# Patient Record
Sex: MALE | Race: WHITE | NOT HISPANIC OR LATINO | Employment: FULL TIME | URBAN - METROPOLITAN AREA
[De-identification: names, ages, dates, MRNs, and addresses within clinical notes are randomized per-mention and may not be internally consistent; named-entity substitution may affect disease eponyms.]

---

## 2017-09-07 ENCOUNTER — OFFICE VISIT - HEALTHEAST (OUTPATIENT)
Dept: FAMILY MEDICINE | Facility: CLINIC | Age: 37
End: 2017-09-07

## 2017-09-07 DIAGNOSIS — Z00.00 VISIT FOR PREVENTIVE HEALTH EXAMINATION: ICD-10-CM

## 2017-09-07 ASSESSMENT — MIFFLIN-ST. JEOR: SCORE: 1720.48

## 2017-09-19 ENCOUNTER — AMBULATORY - HEALTHEAST (OUTPATIENT)
Dept: LAB | Facility: CLINIC | Age: 37
End: 2017-09-19

## 2017-09-19 DIAGNOSIS — Z13.9 SCREENING FOR UNSPECIFIED CONDITION: ICD-10-CM

## 2017-10-03 ENCOUNTER — AMBULATORY - HEALTHEAST (OUTPATIENT)
Dept: FAMILY MEDICINE | Facility: CLINIC | Age: 37
End: 2017-10-03

## 2017-10-03 DIAGNOSIS — Z11.3 SCREEN FOR STD (SEXUALLY TRANSMITTED DISEASE): ICD-10-CM

## 2017-10-11 ENCOUNTER — AMBULATORY - HEALTHEAST (OUTPATIENT)
Dept: LAB | Facility: CLINIC | Age: 37
End: 2017-10-11

## 2017-10-11 DIAGNOSIS — Z11.3 SCREEN FOR STD (SEXUALLY TRANSMITTED DISEASE): ICD-10-CM

## 2017-10-13 LAB
HBV CORE IGM SERPL QL IA: NEGATIVE
HBV SURFACE AB SERPL IA-ACNC: NEGATIVE M[IU]/ML

## 2017-12-23 ENCOUNTER — RECORDS - HEALTHEAST (OUTPATIENT)
Dept: ADMINISTRATIVE | Facility: OTHER | Age: 37
End: 2017-12-23

## 2018-04-27 ENCOUNTER — RECORDS - HEALTHEAST (OUTPATIENT)
Dept: ADMINISTRATIVE | Facility: OTHER | Age: 38
End: 2018-04-27

## 2019-05-27 ENCOUNTER — RECORDS - HEALTHEAST (OUTPATIENT)
Dept: ADMINISTRATIVE | Facility: OTHER | Age: 39
End: 2019-05-27

## 2019-05-31 ENCOUNTER — OFFICE VISIT - HEALTHEAST (OUTPATIENT)
Dept: FAMILY MEDICINE | Facility: CLINIC | Age: 39
End: 2019-05-31

## 2019-05-31 DIAGNOSIS — R11.0 NAUSEA: ICD-10-CM

## 2019-05-31 DIAGNOSIS — R50.9 FEVER AND CHILLS: ICD-10-CM

## 2019-05-31 DIAGNOSIS — R74.01 TRANSAMINITIS: ICD-10-CM

## 2019-05-31 DIAGNOSIS — R10.84 ABDOMINAL PAIN, GENERALIZED: ICD-10-CM

## 2019-05-31 LAB
ALBUMIN SERPL-MCNC: 3.4 G/DL (ref 3.5–5)
ALP SERPL-CCNC: 142 U/L (ref 45–120)
ALT SERPL W P-5'-P-CCNC: 170 U/L (ref 0–45)
ANION GAP SERPL CALCULATED.3IONS-SCNC: 13 MMOL/L (ref 5–18)
AST SERPL W P-5'-P-CCNC: 205 U/L (ref 0–40)
BILIRUB SERPL-MCNC: 0.6 MG/DL (ref 0–1)
BUN SERPL-MCNC: 9 MG/DL (ref 8–22)
CALCIUM SERPL-MCNC: 8.9 MG/DL (ref 8.5–10.5)
CHLORIDE BLD-SCNC: 103 MMOL/L (ref 98–107)
CO2 SERPL-SCNC: 22 MMOL/L (ref 22–31)
CREAT SERPL-MCNC: 0.69 MG/DL (ref 0.7–1.3)
ERYTHROCYTE [SEDIMENTATION RATE] IN BLOOD BY WESTERGREN METHOD: 23 MM/HR (ref 0–15)
GFR SERPL CREATININE-BSD FRML MDRD: >60 ML/MIN/1.73M2
GLUCOSE BLD-MCNC: 88 MG/DL (ref 70–125)
HAV IGM SERPL QL IA: NEGATIVE
HBV CORE IGM SERPL QL IA: NEGATIVE
HBV SURFACE AG SERPL QL IA: NEGATIVE
HCV AB SERPL QL IA: NEGATIVE
HIV 1+2 AB+HIV1 P24 AG SERPL QL IA: NEGATIVE
POTASSIUM BLD-SCNC: 3.8 MMOL/L (ref 3.5–5)
PROT SERPL-MCNC: 6.5 G/DL (ref 6–8)
SODIUM SERPL-SCNC: 138 MMOL/L (ref 136–145)

## 2019-05-31 RX ORDER — ONDANSETRON 8 MG/1
8 TABLET, ORALLY DISINTEGRATING ORAL EVERY 8 HOURS PRN
Qty: 10 TABLET | Refills: 0 | Status: SHIPPED | OUTPATIENT
Start: 2019-05-31 | End: 2023-01-18

## 2019-05-31 RX ORDER — IBUPROFEN 200 MG
200 TABLET ORAL EVERY 6 HOURS PRN
Status: SHIPPED | COMMUNITY
Start: 2019-05-31 | End: 2023-01-18

## 2019-05-31 ASSESSMENT — MIFFLIN-ST. JEOR: SCORE: 1747.46

## 2019-06-02 LAB
EBV EA-D IGG SER-ACNC: <0.2 AI (ref 0–0.8)
EBV NA IGG SER IA-ACNC: <0.2 AI (ref 0–0.8)
EBV VCA IGG SER IA-ACNC: <0.2 AI (ref 0–0.8)
EBV VCA IGM SER IA-ACNC: 1.3 AI (ref 0–0.8)

## 2019-06-03 ENCOUNTER — AMBULATORY - HEALTHEAST (OUTPATIENT)
Dept: FAMILY MEDICINE | Facility: CLINIC | Age: 39
End: 2019-06-03

## 2019-06-03 ENCOUNTER — COMMUNICATION - HEALTHEAST (OUTPATIENT)
Dept: FAMILY MEDICINE | Facility: CLINIC | Age: 39
End: 2019-06-03

## 2019-06-03 ENCOUNTER — RECORDS - HEALTHEAST (OUTPATIENT)
Dept: FAMILY MEDICINE | Facility: CLINIC | Age: 39
End: 2019-06-03

## 2019-06-03 DIAGNOSIS — A69.20 LYME DISEASE: ICD-10-CM

## 2019-06-03 LAB — B BURGDOR IGG+IGM SER QL: 8.44 INDEX VALUE

## 2019-06-07 LAB
B BURGDOR AB SER-IMP: ABNORMAL
LYME AB IGG BAND(S): ABNORMAL
LYME AB IGM BAND(S): ABNORMAL
LYME IGG BLOT: POSITIVE
LYME IGM BLOT: NEGATIVE

## 2019-06-10 ENCOUNTER — COMMUNICATION - HEALTHEAST (OUTPATIENT)
Dept: FAMILY MEDICINE | Facility: CLINIC | Age: 39
End: 2019-06-10

## 2019-07-10 ENCOUNTER — AMBULATORY - HEALTHEAST (OUTPATIENT)
Dept: FAMILY MEDICINE | Facility: CLINIC | Age: 39
End: 2019-07-10

## 2019-07-10 ENCOUNTER — COMMUNICATION - HEALTHEAST (OUTPATIENT)
Dept: FAMILY MEDICINE | Facility: CLINIC | Age: 39
End: 2019-07-10

## 2019-07-10 DIAGNOSIS — R74.01 TRANSAMINITIS: ICD-10-CM

## 2021-05-25 ENCOUNTER — RECORDS - HEALTHEAST (OUTPATIENT)
Dept: ADMINISTRATIVE | Facility: CLINIC | Age: 41
End: 2021-05-25

## 2021-05-29 NOTE — PATIENT INSTRUCTIONS - HE
1. Acetaminophen (500 mg tabs): maximum 6-8 tabs  2. Ibuprofen can be 600 mg every 6-8 hours  3. Try the anti nausea medications  4. Labs today --I will let you know the results  5. Return to clinic next week if symptoms still present

## 2021-05-29 NOTE — PROGRESS NOTES
Assessment & Plan  The patient's labs resulted in a positive Lyme's disease titer, and a positive EBV IgM test.  These tests are known to cause cross-reactivity.  His symptoms are most consistent with mononucleosis, and a positive EBV test can cause a false positive Lyme's disease test.  I did discuss this with the patient.  We did discuss that Lyme's disease can be treated with an antibiotic to prevent long-term complications.  We discussed that it may be unnecessary to take the antibiotic if this is just a false positive test.  He would like to be treated for Lyme's disease, as he would like to prevent this long-term complication.  I prescribed doxycycline 100 mg twice daily for 21 days.  We did discuss the common side effects including photosensitivity.  We discussed the diagnosis of mononucleosis, including the risk of splenic rupture.  He was advised to be seen immediately if he has abdominal pain.  He continues to have elevated liver function tests.  I think it would be reasonable to recheck these in about 2 to 4 weeks.      1. Fever and chills  Fever of unknown source for the last 5 days.  At this point unable to determine whether this is a viral illness.  Will obtain testing for Emerald-Bartholomew, HIV, hepatitis.  If the fever continues, I advised that he should be seen again next week.  We discussed symptomatic treatment, including the maximum doses recommended of Tylenol and ibuprofen.  If the fever persists for 3 weeks, then additional testing should be done.    Per AAFP recommendations, if the fever lasts more than 3 weeks a more in depth workup may be indicated including: blood cultures, CXR, lactate dehydrogenase, creatine kinase, rheumatoid factor, and antinuclear antibodies    2. Abdominal pain, generalized  No abdominal pain on palpation in clinic today.  I will repeat a CMP given the abnormalities earlier this week and his continued symptoms.  He did have an abnormal CT with borderline splenomegaly.  We  discussed that he should avoid any activity that could cause trauma to his abdomen due to risk of splenic rupture.  I would repeat imaging in about a month to ensure stable or resolving changes-- Ultrasound.     - HIV Antigen/Antibody Screening Cascade  - Hepatitis Acute Evaluation  - Comprehensive Metabolic Panel  - Erythrocyte Sedimentation Rate  - Emerald-Barr Virus (EBV) Antibodies, IgG  - Emerald-Barr Virus (EBV) Capsid Antibody,IGM(EBVCAM)    3. Transaminitis  Congestion pattern of elevated transaminitis.  Normal bilirubin and normal platelets.  We will repeat the labs today in addition to an acute hepatitis panel.    - Hepatitis Acute Evaluation  - Comprehensive Metabolic Panel  -EBV serology    4. Nausea  - ondansetron (ZOFRAN-ODT) 8 MG disintegrating tablet; Take 1 tablet (8 mg total) by mouth every 8 (eight) hours as needed for nausea.  Dispense: 10 tablet; Refill: 0    5. Nevi:  Does have some abnormal looking nevi on his back.  And I do not think this is contributing to his current illness.  I did recommend that he return to clinic to have these biopsied.  There is one on the left lower back that is dark and greater than 0.8 cm.    Gypsy Ballard MD    Subjective  Chief Complaint:  Follow-up (chills, nausea, fatigue, fever (103) x 1 week)    HPI:   Abhay Quintero is a 39 y.o. male who presents for follow-up from [facility].  He was seen there on May 27.  He complains of an illness that started 10 days ago.  At that time he felt fatigued and had decreased appetite.  He then started to have high fevers about 5 days ago.  These fevers were to 102 to 103 degrees.  He was evaluated at the [facility] with blood tests and a CT of his abdomen.  He was given fluids and some medicine for nausea.  He was told that he likely had a viral infection, but that they were not sure what the infection was.    Upon review of his chart, his labs were remarkable for elevated LFTs, alk phos.  He had a normal complete  blood count with normal white count.  He had a negative flu test and a negative heterophile for mono.  The CT was remarkable for borderline splenomegaly.    He continues to feel the same symptoms.  He has loss of appetite but no vomiting.  He is able to drink water by taking small sips.  He has a normal appetite when he starts to eat feels more nauseated.  He has had no change in his stools.  No dysuria.  No cough, no rhinorrhea.  He has no recent travel outside of the [place].  He has no sick contacts.  He does not work in healthcare.  He has no rashes.  He does have night sweats.  Last fever was this morning.  He has been taking ibuprofen and Tylenol alternating doses.  He has not taken any other over-the-counter medications.  The abdominal pain that he complained about was actually his right flank.  He denies dysuria.    Prior to this illness, he did not take any medications or over-the-counter's.  He denies a history of tobacco or drug use.  He does drink alcohol, about 1-2 servings per day.  He has not had any alcohol since this started.      Allergies:  has No Known Allergies.    SH/FH:  Social History and Family History reviewed and updated.   Tobacco Status:  He  reports that he has never smoked. He has never used smokeless tobacco.    Review of Systems:    Constitutional: No Weight Change,  Fever,  Chills,  Night Sweats, fatigue   ENT/Mouth: No sore throat, No Rhinorrhea, No Swallowing Difficulty   Eyes: No Vision Changes   Cardiovascular: No Chest Pain, No SOB, No Edema, No Palpitations   Respiratory: No Cough, No Dyspnea   Gastrointestinal:  Nausea, No Vomiting, No Diarrhea, No Constipation,  Pain,   Genitourinary: No Dysuria   Musculoskeletal: No Arthralgias  Skin: No Skin Lesions  Neuro:  Weakness, No Numbness, No Headache  Heme/Lymph: No Bruising, has not noticed any lymphadenopathy  Endocrine: No Polyuria    Objective  Vitals:    05/31/19 0855   BP: 110/60   Patient Site: Right Arm   Patient  "Position: Sitting   Cuff Size: Adult Regular   Pulse: 90   Temp: 98.4  F (36.9  C)   TempSrc: Oral   SpO2: 97%   Weight: 178 lb (80.7 kg)   Height: 5' 11.5\" (1.816 m)       Physical Exam:  GENERAL: Alert, well-appearing, in no acute distress.   PSYCH: Pleasant mood, affect appropriate.  Good eye contact.  SKIN: several nevi on his lower back -- a couple of which are >0.8 cm.  HEAD: Normocephalic, atraumatic  EYES: Conjunctiva pink, sclera white, no exudates. RANDI.  EOMs intact.   Ears: TM clear bilaterally  MOUTH: Pharynx moist, pink without exudate. No tonsillar enlargement  NECK: No lymphadenopathy. No palpable thyromegaly or nodules.   CV: Regular rate and rhythm without murmurs, rubs or gallops.   RESP: No increased work of breathing.  Lung sounds bilateral, clear, symmetric excursion.   ABDOMEN: No abdominal distension. Abdomen soft, nontender to palpation without guarding. No palpable organomegaly, masses or hernias.  NO CVA tenderness.  No tenderness of the flank on palpation  MSK: No deformity.    NEURO: Alert and oriented. Normal motor and sensory        "

## 2021-05-29 NOTE — TELEPHONE ENCOUNTER
Test Results  Who is calling?:  Patient  Who ordered the test:  Gypsy Angel .  Type of test: Lab  Date of test:  05/31/19  Where was the test performed:  [facility]  What are your questions/concerns?:  Patients was to discuss about lab results with the provider. Pt would like to get a call.  Okay to leave a detailed message?:  Yes

## 2021-05-29 NOTE — TELEPHONE ENCOUNTER
I think I already spoke to him regarding these results.  Do you know when this message was sent?  I reviewed the mono and lyme disease tests.

## 2021-05-31 VITALS — HEIGHT: 72 IN | WEIGHT: 171 LBS | BODY MASS INDEX: 23.16 KG/M2

## 2021-06-03 VITALS — HEIGHT: 72 IN | WEIGHT: 178 LBS | BODY MASS INDEX: 24.11 KG/M2

## 2021-06-12 NOTE — PROGRESS NOTES
Assessment:      Healthy male exam.        Plan:     1.  Regular skin exam at home, and follow-up with dermatologist on a yearly basis.  2. Patient Counseling:  --Nutrition: Stressed importance of moderation in sodium/caffeine intake, saturated fat and cholesterol, caloric balance, sufficient intake of fresh fruits, vegetables, fiber, calcium, iron.  --Discussed the issue of calcium supplement, and the daily use of baby aspirin.  --Exercise: Stressed the importance of regular exercise.   --Substance Abuse: Discussed cessation/primary prevention of tobacco, alcohol, or other drug use; driving or other dangerous activities under the influence; availability of treatment for abuse.    --Sexuality: Discussed sexually transmitted diseases, partner selection, use of condoms, avoidance of unintended pregnancy.  --Injury prevention: Discussed safety belts, safety helmets, smoke detector, smoking near bedding or upholstery.   --Dental health: Discussed importance of regular tooth brushing, flossing, and dental visits.  --Immunizations reviewed.  --Discussed timing and benefits of screening colonoscopy and alternative options.  --After hours service discussed with patient             -- The following high BMI interventions were performed this visit: encouragement to exercise    3. Discussed the patient's BMI with him.  The BMI is in the acceptable range  4. Follow up as needed for acute illness     Subjective:      Abhay Quintero is a 37 y.o. male who presents for an annual exam. The patient reports that there is not domestic violence in his life.   Overall doing fine, he is involved in a fertilization program with his wife, they are asking him to get a physical exam.  Healthy Habits:   Regular Exercise: Yes  Sunscreen Use: Yes  Healthy Diet: Yes  Dental Visits Regularly: Yes  Seat Belt: Yes  Sexually active: Yes  Monthly Self Testicular Exams:  Yes  Hemoccults: N/A  Flex Sig: N/A  Colonoscopy: N/A  Lipid Profile:  "No  Glucose Screen: Yes  Prevention of Osteoporosis: Yes  Last Dexa: N/A  Guns at Home:  N/A      Immunization History   Administered Date(s) Administered     Influenza, inj, historic 10/05/2016     Influenza, nasal, historic 09/14/2013     Influenza,live, Nasal Laiv4 09/30/2014     MMR 04/13/1993     Tdap 09/30/2014     Immunization status: stated as current, but no records available.  Vision Screening:both eyes  Hearing: PASS     No current outpatient prescriptions on file.     No current facility-administered medications for this visit.      Past Medical History:   Diagnosis Date     Acute pneumothorax      No past surgical history on file.  Review of patient's allergies indicates no known allergies.  No family history on file.  Social History     Social History     Marital status:      Spouse name: N/A     Number of children: N/A     Years of education: N/A     Occupational History     Not on file.     Social History Main Topics     Smoking status: Never Smoker     Smokeless tobacco: Not on file     Alcohol use Not on file     Drug use: Not on file     Sexual activity: Not on file     Other Topics Concern     Not on file     Social History Narrative       Review of Systems  General:  Denies problem  Eyes: Denies problem  Ears/Nose/Throat: Denies problem  Cardiovascular: Denies problem  Respiratory:  Denies problem  Gastrointestinal:  Denies problem  Genitourinary: Denies problem  Musculoskeletal:  Denies problem  Skin: Denies problem  Neurologic: Denies problem  Psychiatric: Denies problem  Endocrine: Denies problem  Heme/Lymphatic: Denies problem   Allergic/Immunologic: Denies problem        Objective:     Vitals:    09/07/17 0749   BP: 118/68   Pulse: 60   Resp: 12   Temp: 97.4  F (36.3  C)   TempSrc: Oral   Weight: 171 lb (77.6 kg)   Height: 5' 11.8\" (1.824 m)     Body mass index is 23.32 kg/(m^2).    Physical  General Appearance: Alert, cooperative, no distress, appears stated age  Head: " Normocephalic, without obvious abnormality, atraumatic  Eyes: PERRL, conjunctiva/corneas clear, EOM's intact  Ears: Normal TM's and external ear canals, both ears  Nose: Nares normal, septum midline,mucosa normal, no drainage  Throat: Lips, mucosa, and tongue normal; teeth and gums normal  Neck: Supple, symmetrical, trachea midline, no adenopathy;  thyroid: not enlarged, symmetric, no tenderness/mass/nodules; no carotid bruit or JVD  Back: Symmetric, no curvature, ROM normal, no CVA tenderness  Lungs: Clear to auscultation bilaterally, respirations unlabored  Heart: Regular rate and rhythm, S1 and S2 normal, no murmur, rub, or gallop,  Abdomen: Soft, non-tender, bowel sounds active all four quadrants,  no masses, no organomegaly  Genitourinary: Penis normal. Right and left testis are descended.No palpable masses.   Rectal: No visible external lesion  Musculoskeletal: Normal range of motion. No joint swelling or deformity.   Extremities: Extremities normal, atraumatic, no cyanosis or edema  Skin: Skin color, texture, turgor normal, no rashes or lesions  Lymph nodes: Cervical, supraclavicular, and axillary nodes normal  Neurologic: He is alert. He has normal reflexes.   Psychiatric: He has a normal mood and affect.        Bharathi Mcclellan MD

## 2021-06-19 NOTE — LETTER
Letter by Gypsy Ballard MD at      Author: Gypsy Blalard MD Service: -- Author Type: --    Filed:  Encounter Date: 6/3/2019 Status: (Other)         Gayle 3, 2019     Patient: Abhay Quintero   YOB: 1980   Date of Visit: 6/3/2019       To Whom It May Concern:    It is my medical opinion that Abhay Quintero was unable to travel this week due to a medical condition.  Please excuse for missed work.    If you have any questions or concerns, please don't hesitate to call.    Sincerely,        Electronically signed by Gypsy Ballard MD

## 2021-07-03 NOTE — ADDENDUM NOTE
Addendum Note by Gypsy Ballard MD at 5/31/2019  9:00 AM     Author: Gypsy Ballard MD Service: -- Author Type: Physician    Filed: 5/31/2019  4:35 PM Encounter Date: 5/31/2019 Status: Signed    : Gypsy Ballard MD (Physician)    Addended by: GYPSY BALLARD on: 5/31/2019 04:35 PM        Modules accepted: Orders

## 2021-08-08 ENCOUNTER — HEALTH MAINTENANCE LETTER (OUTPATIENT)
Age: 41
End: 2021-08-08

## 2021-10-03 ENCOUNTER — HEALTH MAINTENANCE LETTER (OUTPATIENT)
Age: 41
End: 2021-10-03

## 2022-09-10 ENCOUNTER — HEALTH MAINTENANCE LETTER (OUTPATIENT)
Age: 42
End: 2022-09-10

## 2023-01-18 ENCOUNTER — OFFICE VISIT (OUTPATIENT)
Dept: FAMILY MEDICINE | Facility: CLINIC | Age: 43
End: 2023-01-18
Payer: COMMERCIAL

## 2023-01-18 VITALS
HEIGHT: 74 IN | HEART RATE: 86 BPM | TEMPERATURE: 97.6 F | OXYGEN SATURATION: 99 % | SYSTOLIC BLOOD PRESSURE: 124 MMHG | BODY MASS INDEX: 23.31 KG/M2 | WEIGHT: 181.6 LBS | DIASTOLIC BLOOD PRESSURE: 70 MMHG | RESPIRATION RATE: 20 BRPM

## 2023-01-18 DIAGNOSIS — C43.9 MELANOMA OF SKIN (H): ICD-10-CM

## 2023-01-18 DIAGNOSIS — Z83.79 FAMILY HISTORY OF CELIAC SPRUE: ICD-10-CM

## 2023-01-18 DIAGNOSIS — Z13.220 SCREENING FOR HYPERLIPIDEMIA: ICD-10-CM

## 2023-01-18 DIAGNOSIS — Z13.29 SCREENING FOR THYROID DISORDER: ICD-10-CM

## 2023-01-18 DIAGNOSIS — Z00.00 ROUTINE GENERAL MEDICAL EXAMINATION AT A HEALTH CARE FACILITY: Primary | ICD-10-CM

## 2023-01-18 DIAGNOSIS — H93.12 TINNITUS, LEFT: ICD-10-CM

## 2023-01-18 LAB
CHOLEST SERPL-MCNC: 252 MG/DL
HDLC SERPL-MCNC: 82 MG/DL
LDLC SERPL CALC-MCNC: 152 MG/DL
NONHDLC SERPL-MCNC: 170 MG/DL
TRIGL SERPL-MCNC: 89 MG/DL
TSH SERPL DL<=0.005 MIU/L-ACNC: 0.95 UIU/ML (ref 0.3–4.2)

## 2023-01-18 PROCEDURE — 99213 OFFICE O/P EST LOW 20 MIN: CPT | Mod: 25 | Performed by: STUDENT IN AN ORGANIZED HEALTH CARE EDUCATION/TRAINING PROGRAM

## 2023-01-18 PROCEDURE — 90686 IIV4 VACC NO PRSV 0.5 ML IM: CPT | Performed by: STUDENT IN AN ORGANIZED HEALTH CARE EDUCATION/TRAINING PROGRAM

## 2023-01-18 PROCEDURE — 36415 COLL VENOUS BLD VENIPUNCTURE: CPT | Performed by: STUDENT IN AN ORGANIZED HEALTH CARE EDUCATION/TRAINING PROGRAM

## 2023-01-18 PROCEDURE — 80061 LIPID PANEL: CPT | Performed by: STUDENT IN AN ORGANIZED HEALTH CARE EDUCATION/TRAINING PROGRAM

## 2023-01-18 PROCEDURE — 84443 ASSAY THYROID STIM HORMONE: CPT | Performed by: STUDENT IN AN ORGANIZED HEALTH CARE EDUCATION/TRAINING PROGRAM

## 2023-01-18 PROCEDURE — 0134A COVID-19 VACCINE BIVALENT BOOSTER 18+ (MODERNA): CPT | Performed by: STUDENT IN AN ORGANIZED HEALTH CARE EDUCATION/TRAINING PROGRAM

## 2023-01-18 PROCEDURE — 99386 PREV VISIT NEW AGE 40-64: CPT | Mod: 25 | Performed by: STUDENT IN AN ORGANIZED HEALTH CARE EDUCATION/TRAINING PROGRAM

## 2023-01-18 PROCEDURE — 91313 COVID-19 VACCINE BIVALENT BOOSTER 18+ (MODERNA): CPT | Performed by: STUDENT IN AN ORGANIZED HEALTH CARE EDUCATION/TRAINING PROGRAM

## 2023-01-18 PROCEDURE — 82784 ASSAY IGA/IGD/IGG/IGM EACH: CPT | Performed by: STUDENT IN AN ORGANIZED HEALTH CARE EDUCATION/TRAINING PROGRAM

## 2023-01-18 PROCEDURE — 90471 IMMUNIZATION ADMIN: CPT | Performed by: STUDENT IN AN ORGANIZED HEALTH CARE EDUCATION/TRAINING PROGRAM

## 2023-01-18 PROCEDURE — 86364 TISS TRNSGLTMNASE EA IG CLAS: CPT | Performed by: STUDENT IN AN ORGANIZED HEALTH CARE EDUCATION/TRAINING PROGRAM

## 2023-01-18 NOTE — PROGRESS NOTES
SUBJECTIVE:   CC: Abhay is an 42 year old who presents for preventative health visit.     Patient has been advised of split billing requirements and indicates understanding: Yes  Healthy Habits:     Getting at least 3 servings of Calcium per day:  Yes    Bi-annual eye exam:  Yes    Dental care twice a year:  Yes    Sleep apnea or symptoms of sleep apnea:  None    Diet:  Regular (no restrictions)    Frequency of exercise:  2-3 days/week    Taking medications regularly:  Yes    PHQ-2 Total Score: 0    Work-[employer]  Diet-varied  Exercise-walking, running, lifting weights  Fam hx-father side grandfather prostate cancer (late in life)    Melanoma 1 year ago  -having excision  -L lower leg     Ear ringing  -L ear  -no hearing loss or vertigo      Today's PHQ-2 Score:   PHQ-2 ( 1999 [employer]) 1/18/2023   Q1: Little interest or pleasure in doing things 0   Q2: Feeling down, depressed or hopeless 0   PHQ-2 Score 0   Q1: Little interest or pleasure in doing things Not at all   Q2: Feeling down, depressed or hopeless Not at all   PHQ-2 Score 0       Have you ever done Advance Care Planning? (For example, a Health Directive, POLST, or a discussion with a medical provider or your loved ones about your wishes): No, advance care planning information given to patient to review.  Patient plans to discuss their wishes with loved ones or provider.      Social History     Tobacco Use     Smoking status: Never     Smokeless tobacco: Never   Substance Use Topics     Alcohol use: Not on file     If you drink alcohol do you typically have >3 drinks per day or >7 drinks per week? Yes      Alcohol Use 1/18/2023   Prescreen: >3 drinks/day or >7 drinks/week? No   Prescreen: >3 drinks/day or >7 drinks/week? -     Last PSA: No results found for: PSA    Reviewed orders with patient. Reviewed health maintenance and updated orders accordingly - Yes    Reviewed and updated as needed this visit by clinical staff   Tobacco  Allergies  Meds   "Problems             Reviewed and updated as needed this visit by Provider     Meds  Problems              Review of Systems  CONSTITUTIONAL: NEGATIVE for fever, chills, change in weight  INTEGUMENTARY/SKIN: NEGATIVE for worrisome rashes, moles or lesions  EYES: NEGATIVE for vision changes or irritation  ENT: NEGATIVE for ear, mouth and throat problems  RESP: NEGATIVE for significant cough or SOB  CV: NEGATIVE for chest pain, palpitations or peripheral edema  GI: NEGATIVE for nausea, abdominal pain, heartburn, or change in bowel habits   male: negative for dysuria, hematuria, decreased urinary stream, erectile dysfunction, urethral discharge  MUSCULOSKELETAL: NEGATIVE for significant arthralgias or myalgia  NEURO: NEGATIVE for weakness, dizziness or paresthesias  PSYCHIATRIC: NEGATIVE for changes in mood or affect    OBJECTIVE:   /70 (BP Location: Left arm, Patient Position: Sitting, Cuff Size: Adult Regular)   Pulse 86   Temp 97.6  F (36.4  C) (Temporal)   Resp 20   Ht 1.877 m (6' 1.9\")   Wt 82.4 kg (181 lb 9.6 oz)   SpO2 99%   BMI 23.38 kg/m      Physical Exam  Constitutional:       General: He is not in acute distress.     Appearance: He is well-developed.   HENT:      Head: Normocephalic and atraumatic.      Right Ear: Tympanic membrane, ear canal and external ear normal.      Left Ear: Tympanic membrane, ear canal and external ear normal.      Nose: Nose normal.      Mouth/Throat:      Mouth: Mucous membranes are moist.      Pharynx: No oropharyngeal exudate.   Eyes:      General:         Right eye: No discharge.         Left eye: No discharge.      Extraocular Movements: Extraocular movements intact.      Conjunctiva/sclera: Conjunctivae normal.      Pupils: Pupils are equal, round, and reactive to light.      Comments: R eye slightly larger than L. Wears glasses   Cardiovascular:      Rate and Rhythm: Normal rate and regular rhythm.      Heart sounds: Normal heart sounds. No murmur " heard.  Pulmonary:      Effort: Pulmonary effort is normal.      Breath sounds: No wheezing or rales.   Abdominal:      General: Bowel sounds are normal.      Palpations: Abdomen is soft.      Tenderness: There is no abdominal tenderness.   Musculoskeletal:      Cervical back: Normal range of motion and neck supple. No rigidity.   Lymphadenopathy:      Cervical: No cervical adenopathy.   Skin:     General: Skin is warm and dry.      Findings: No rash.   Neurological:      General: No focal deficit present.      Mental Status: He is alert and oriented to person, place, and time.      Cranial Nerves: No cranial nerve deficit.      Motor: No weakness.   Psychiatric:         Mood and Affect: Mood normal.         Behavior: Behavior normal.         Thought Content: Thought content normal.         Judgment: Judgment normal.       ASSESSMENT/PLAN:   Abhay was seen today for physical.    Routine general medical examination at a health care facility  Practicing a healthy lifestyle. Counseled on healthy diet/exercise. Normal vitals/exam. Will get screening lipids, flu shot, covid booster. Follow up in 1 year.    Screening for thyroid disorder  Eye doctor noted R eye is slightly larger than L, advised to get tested for thyroid disease. Minimal asymmetry b/w eye size on exam. No s/s of thyroid disease. Will order TSH.  -     TSH with free T4 reflex; Future    Family history of celiac sprue  Requesting testing for celiac as son was diagnosed at age 5 via biopsy/endoscopy. Denies diarrhea, ab pain, rash, or intolerance to gluten.  Comments:  son diagnosed with celiac via endoscopy  Orders:  -     Tissue transglutaminase leila IgA and IgG; Future  -     IgA [LAB73]; Future    Screening for hyperlipidemia  -     Lipid panel; Future    Tinnitus, left  Ear exam normal, no signs of infection or excessive ear wax. Denies vertigo, hearing loss. Audiology referral ordered.  -     Adult Audiology  Referral; Future    Melanoma of  skin (H)  Follows with dermatology.  Comments:  L lower leg s/p excision  diagnosed     Other orders  -     INFLUENZA VACCINE IM > 6 MONTHS VALENT IIV4 (AFLURIA/FLUZONE)  -     REVIEW OF HEALTH MAINTENANCE PROTOCOL ORDERS  -     COVID-19,PF,MODERNA BIVALENT (18+YRS)      COUNSELING:   Reviewed preventive health counseling, as reflected in patient instructions       Regular exercise       Healthy diet/nutrition       Vision screening       Immunizations       Alcohol Use        Consider Hep C screening for all patients one time for ages 18-79 years       HIV screeninx in teen years, 1x in adult years, and at intervals if high risk      He reports that he has never smoked. He has never used smokeless tobacco.      Catalino Carlos, [facility]

## 2023-01-18 NOTE — PATIENT INSTRUCTIONS
Preventive Health Recommendations  Male Ages 40 to 49    Yearly exam:             See your health care provider every year in order to  o   Review health changes.   o   Discuss preventive care.    o   Review your medicines if your doctor has prescribed any.  You should be tested each year for STDs (sexually transmitted diseases) if you re at risk.   Have a cholesterol test every 5 years.   Have a colonoscopy (test for colon cancer) if someone in your family has had colon cancer or polyps before age 50.   After age 45, have a diabetes test (fasting glucose). If you are at risk for diabetes, you should have this test every 3 years.    Talk with your health care provider about whether or not a prostate cancer screening test (PSA) is right for you.    Shots: Get a flu shot each year. Get a tetanus shot every 10 years.     Nutrition:  Eat at least 5 servings of fruits and vegetables daily.   Eat whole-grain bread, whole-wheat pasta and brown rice instead of white grains and rice.   Get adequate Calcium and Vitamin D.     Lifestyle  Exercise for at least 150 minutes a week (30 minutes a day, 5 days a week). This will help you control your weight and prevent disease.   Limit alcohol to one drink per day.   No smoking.   Wear sunscreen to prevent skin cancer.   See your dentist every six months for an exam and cleaning.

## 2023-01-19 LAB
IGA SERPL-MCNC: 110 MG/DL (ref 84–499)
TTG IGA SER-ACNC: 0.4 U/ML
TTG IGG SER-ACNC: 0.7 U/ML

## 2023-03-22 ENCOUNTER — TRANSFERRED RECORDS (OUTPATIENT)
Dept: HEALTH INFORMATION MANAGEMENT | Facility: CLINIC | Age: 43
End: 2023-03-22

## 2024-01-04 ENCOUNTER — PATIENT OUTREACH (OUTPATIENT)
Dept: CARE COORDINATION | Facility: CLINIC | Age: 44
End: 2024-01-04
Payer: COMMERCIAL

## 2024-01-18 ENCOUNTER — PATIENT OUTREACH (OUTPATIENT)
Dept: CARE COORDINATION | Facility: CLINIC | Age: 44
End: 2024-01-18
Payer: COMMERCIAL

## 2025-05-11 ENCOUNTER — HEALTH MAINTENANCE LETTER (OUTPATIENT)
Age: 45
End: 2025-05-11